# Patient Record
Sex: FEMALE | Race: ASIAN | NOT HISPANIC OR LATINO | URBAN - METROPOLITAN AREA
[De-identification: names, ages, dates, MRNs, and addresses within clinical notes are randomized per-mention and may not be internally consistent; named-entity substitution may affect disease eponyms.]

---

## 2019-06-09 ENCOUNTER — INPATIENT (INPATIENT)
Facility: HOSPITAL | Age: 59
LOS: 1 days | Discharge: HOME | DRG: 313 | End: 2019-06-11
Attending: INTERNAL MEDICINE | Admitting: INTERNAL MEDICINE
Payer: MEDICAID

## 2019-06-09 VITALS
TEMPERATURE: 98 F | WEIGHT: 119.93 LBS | HEART RATE: 112 BPM | DIASTOLIC BLOOD PRESSURE: 91 MMHG | SYSTOLIC BLOOD PRESSURE: 144 MMHG | RESPIRATION RATE: 16 BRPM | OXYGEN SATURATION: 98 % | HEIGHT: 61 IN

## 2019-06-09 DIAGNOSIS — I21.4 NON-ST ELEVATION (NSTEMI) MYOCARDIAL INFARCTION: ICD-10-CM

## 2019-06-09 DIAGNOSIS — R07.89 OTHER CHEST PAIN: ICD-10-CM

## 2019-06-09 DIAGNOSIS — I10 ESSENTIAL (PRIMARY) HYPERTENSION: ICD-10-CM

## 2019-06-09 DIAGNOSIS — Z29.9 ENCOUNTER FOR PROPHYLACTIC MEASURES, UNSPECIFIED: ICD-10-CM

## 2019-06-09 DIAGNOSIS — Z98.890 OTHER SPECIFIED POSTPROCEDURAL STATES: Chronic | ICD-10-CM

## 2019-06-09 LAB
ALBUMIN SERPL ELPH-MCNC: 4 G/DL — SIGNIFICANT CHANGE UP (ref 3.5–5)
ALP SERPL-CCNC: 110 U/L — SIGNIFICANT CHANGE UP (ref 40–120)
ALT FLD-CCNC: 32 U/L DA — SIGNIFICANT CHANGE UP (ref 10–60)
ANION GAP SERPL CALC-SCNC: 9 MMOL/L — SIGNIFICANT CHANGE UP (ref 5–17)
APTT BLD: 34.7 SEC — SIGNIFICANT CHANGE UP (ref 27.5–36.3)
AST SERPL-CCNC: 16 U/L — SIGNIFICANT CHANGE UP (ref 10–40)
BILIRUB SERPL-MCNC: 0.3 MG/DL — SIGNIFICANT CHANGE UP (ref 0.2–1.2)
BUN SERPL-MCNC: 11 MG/DL — SIGNIFICANT CHANGE UP (ref 7–18)
CALCIUM SERPL-MCNC: 9 MG/DL — SIGNIFICANT CHANGE UP (ref 8.4–10.5)
CHLORIDE SERPL-SCNC: 107 MMOL/L — SIGNIFICANT CHANGE UP (ref 96–108)
CK MB BLD-MCNC: <1.7 % — SIGNIFICANT CHANGE UP (ref 0–3.5)
CK MB CFR SERPL CALC: <1 NG/ML — SIGNIFICANT CHANGE UP (ref 0–3.6)
CK SERPL-CCNC: 58 U/L — SIGNIFICANT CHANGE UP (ref 21–215)
CO2 SERPL-SCNC: 27 MMOL/L — SIGNIFICANT CHANGE UP (ref 22–31)
CREAT SERPL-MCNC: 0.75 MG/DL — SIGNIFICANT CHANGE UP (ref 0.5–1.3)
D DIMER BLD IA.RAPID-MCNC: <150 NG/ML DDU — SIGNIFICANT CHANGE UP
GLUCOSE SERPL-MCNC: 118 MG/DL — HIGH (ref 70–99)
HCT VFR BLD CALC: 45.8 % — HIGH (ref 34.5–45)
HGB BLD-MCNC: 15 G/DL — SIGNIFICANT CHANGE UP (ref 11.5–15.5)
INR BLD: 0.93 RATIO — SIGNIFICANT CHANGE UP (ref 0.88–1.16)
MCHC RBC-ENTMCNC: 29.1 PG — SIGNIFICANT CHANGE UP (ref 27–34)
MCHC RBC-ENTMCNC: 32.8 GM/DL — SIGNIFICANT CHANGE UP (ref 32–36)
MCV RBC AUTO: 88.8 FL — SIGNIFICANT CHANGE UP (ref 80–100)
NRBC # BLD: 0 /100 WBCS — SIGNIFICANT CHANGE UP (ref 0–0)
PLATELET # BLD AUTO: 311 K/UL — SIGNIFICANT CHANGE UP (ref 150–400)
POTASSIUM SERPL-MCNC: 3.7 MMOL/L — SIGNIFICANT CHANGE UP (ref 3.5–5.3)
POTASSIUM SERPL-SCNC: 3.7 MMOL/L — SIGNIFICANT CHANGE UP (ref 3.5–5.3)
PROT SERPL-MCNC: 8.8 G/DL — HIGH (ref 6–8.3)
PROTHROM AB SERPL-ACNC: 10.3 SEC — SIGNIFICANT CHANGE UP (ref 10–12.9)
RBC # BLD: 5.16 M/UL — SIGNIFICANT CHANGE UP (ref 3.8–5.2)
RBC # FLD: 13.2 % — SIGNIFICANT CHANGE UP (ref 10.3–14.5)
SODIUM SERPL-SCNC: 143 MMOL/L — SIGNIFICANT CHANGE UP (ref 135–145)
TROPONIN I SERPL-MCNC: 0.02 NG/ML — SIGNIFICANT CHANGE UP (ref 0–0.04)
TROPONIN I SERPL-MCNC: 0.02 NG/ML — SIGNIFICANT CHANGE UP (ref 0–0.04)
TROPONIN I SERPL-MCNC: 0.05 NG/ML — HIGH (ref 0–0.04)
WBC # BLD: 5.73 K/UL — SIGNIFICANT CHANGE UP (ref 3.8–10.5)
WBC # FLD AUTO: 5.73 K/UL — SIGNIFICANT CHANGE UP (ref 3.8–10.5)

## 2019-06-09 PROCEDURE — 71045 X-RAY EXAM CHEST 1 VIEW: CPT | Mod: 26

## 2019-06-09 PROCEDURE — 93010 ELECTROCARDIOGRAM REPORT: CPT

## 2019-06-09 PROCEDURE — 99285 EMERGENCY DEPT VISIT HI MDM: CPT

## 2019-06-09 RX ORDER — METOPROLOL TARTRATE 50 MG
25 TABLET ORAL DAILY
Refills: 0 | Status: DISCONTINUED | OUTPATIENT
Start: 2019-06-09 | End: 2019-06-09

## 2019-06-09 RX ORDER — ATORVASTATIN CALCIUM 80 MG/1
40 TABLET, FILM COATED ORAL AT BEDTIME
Refills: 0 | Status: DISCONTINUED | OUTPATIENT
Start: 2019-06-09 | End: 2019-06-11

## 2019-06-09 RX ORDER — METOPROLOL TARTRATE 50 MG
12.5 TABLET ORAL DAILY
Refills: 0 | Status: DISCONTINUED | OUTPATIENT
Start: 2019-06-09 | End: 2019-06-11

## 2019-06-09 RX ORDER — ASPIRIN/CALCIUM CARB/MAGNESIUM 324 MG
81 TABLET ORAL DAILY
Refills: 0 | Status: DISCONTINUED | OUTPATIENT
Start: 2019-06-09 | End: 2019-06-11

## 2019-06-09 RX ADMIN — ATORVASTATIN CALCIUM 40 MILLIGRAM(S): 80 TABLET, FILM COATED ORAL at 21:15

## 2019-06-09 NOTE — ED ADULT NURSE REASSESSMENT NOTE - NS ED NURSE REASSESS COMMENT FT1
received pt from ARIK Sim, JOSR/ox3, breathing unlabored, NAD. Denies any pain. RAC20g present. Telemetry monitoring in process.

## 2019-06-09 NOTE — ED ADULT NURSE NOTE - CHIEF COMPLAINT QUOTE
C/o feeling chest pain, anxiety and dizziness on take off on flight from [facility]

## 2019-06-09 NOTE — ED PROVIDER NOTE - OBJECTIVE STATEMENT
59 y/o F pt with PMHx of HTN (adherent to medication) presents to ED c/o dizziness, chest pressure, tingling and stiffness to bilateral arms, and rapid HR x today. Pt reports onset of symptoms while on an airplane, shortly prior to take-off. Pt was provided with O2 and 2 ASA by the flight crew. Pt does report h/o anxiety/nervousness and believes symptoms might have been related to anxiety. In ED, pt reports symptoms are resolved and she feels back to baseline. Patient denies LOC, nausea, vomiting, abd pain, fever, chills, swelling, rash, headache, back pain, shortness of breath, h/o PE or DVT, h/o cardiac dz, or any other complaints. Social Hx: nonsmoker, no alcohol use, no drug use. Translation services offered but pt declined, opting to have family member at bedside translate. 59 y/o F pt with PMHx of HTN (adherent to medication) presents to ED c/o dizziness, chest pressure, tingling and stiffness to bilateral arms, and rapid HR x today since about 7:30 am. Pt reports onset of symptoms while on an airplane, shortly prior to take-off. Pt was provided with O2 and 2 ASA by the flight crew. Pt does report h/o anxiety/nervousness and believes symptoms might have been related to anxiety. In ED, pt reports symptoms are resolved and she feels back to baseline. Patient denies LOC, nausea, vomiting, abd pain, fever, chills, swelling, rash, headache, back pain, shortness of breath, h/o PE or DVT, h/o cardiac dz, or any other complaints. Social Hx: nonsmoker, no alcohol use, no drug use. Translation services offered but pt declined, opting to have family member at bedside translate.

## 2019-06-09 NOTE — ED ADULT TRIAGE NOTE - CHIEF COMPLAINT QUOTE
C/o feeling chest pain, anxiety and dizziness on take off on flight from [facility]

## 2019-06-09 NOTE — ED ADULT NURSE NOTE - NSIMPLEMENTINTERV_GEN_ALL_ED
Implemented All Universal Safety Interventions:  [place] to call system. Call bell, personal items and telephone within reach. Instruct patient to call for assistance. Room bathroom lighting operational. Non-slip footwear when patient is off stretcher. Physically safe environment: no spills, clutter or unnecessary equipment. Stretcher in lowest position, wheels locked, appropriate side rails in place.

## 2019-06-09 NOTE — H&P ADULT - ASSESSMENT
Patient is 57 y/o Female from [facility] with PMHx of HTN (adherent to medication) and Anxiety  presents to ED c/o dizziness, chest pressure, tingling and stiffness to bilateral arms since 1 day.  Pt reports onset of symptoms while on an airplane, shortly prior to take-off. Patient was feeling Dizzy, had episode of chest pressure, Right and left arm numbness. Pt was provided with O2 and 2 ASA by the flight crew. Pt does report h/o anxiety/nervousness and believes symptoms might have been related to anxiety. Patient has similar episode 1 week ago, which resolved spontaneously. currently patient denies chest pian, dizziness, lightheadedness, vertigo, upper and lower ext weakness. Patient denies fever, chills, headaches, blurring of vision, nystagmus, nausea, vomiting, diarrhea, abdominal pain, cough, SOB, palpitation, leg swelling, dysuria, or focal motor/sensory deficits.    On admission pt was afebrile, HD stable, EKG NSR trop T1 neg   no leucocytosis, Normal electrolytes and renal functions   CXR showed  Suspect right upper lobe infiltrate.  D-dimer Neg

## 2019-06-09 NOTE — ED PROVIDER NOTE - CHPI ED SYMPTOMS NEG
no nausea/no chills/no fever/no shortness of breath/no vomiting/no back pain/no LOC, no abd pain, no swelling, no rash, no headache

## 2019-06-09 NOTE — H&P ADULT - NSHPPHYSICALEXAM_GEN_ALL_CORE
Vital Signs Last 24 Hrs  T(C): 36.8 (09 Jun 2019 15:34), Max: 36.9 (09 Jun 2019 08:48)  T(F): 98.3 (09 Jun 2019 15:34), Max: 98.4 (09 Jun 2019 08:48)  HR: 74 (09 Jun 2019 15:34) (74 - 112)  BP: 100/71 (09 Jun 2019 15:34) (100/71 - 144/91)  RR: 18 (09 Jun 2019 15:34) (16 - 18)  SpO2: 99% (09 Jun 2019 15:34) (98% - 99%)

## 2019-06-09 NOTE — ED ADULT NURSE NOTE - OBJECTIVE STATEMENT
maranda from [facility] as the patient was experiencing, some chest pressure and anxiety while preparing for take off. feels better on arrival

## 2019-06-09 NOTE — ED ADULT NURSE REASSESSMENT NOTE - NS ED NURSE REASSESS COMMENT FT1
Received pt form ARIK Lauren, pt observed laying in bed, sleeping, breathing room air, in no respiratory distress at time of assessment. Pt is A&O x3, able to make needs known, skin intact, pt ambulates on own, right AC #20Ga in place. No meds to be administered at this time. Admitted to [facility], on box H, sinus rhythm noted at this time, HR 77. Report given to ARIK Guzman by [facility] Lauren (ED RN[facility]ing [facility], nursing monitoring continues. Family at bedside.

## 2019-06-09 NOTE — ED PROVIDER NOTE - PROGRESS NOTE DETAILS
Second troponin came back elevated.  EKG unremarkable.  Pt reassessed and currently no chest pain. Second troponin came back elevated.  EKG unremarkable.  Pt reassessed and currently no chest pain.  Pt confirms that she received baby aspirin x 2 on airplane just after onset of symptoms, and then baby aspirin x 2 given by EMS, therefore no additional aspirin to be given at this time. Informed Dr. LI Sousa for admission. TK hicks.

## 2019-06-09 NOTE — ED ADULT NURSE NOTE - ED STAT RN HANDOFF DETAILS
pt endorsed to Rn JOSR Zapata/[email or web address], telly box H in place, LAC20g. NAD, breathing unlabored, denies any pain.

## 2019-06-09 NOTE — ED PROVIDER NOTE - CLINICAL SUMMARY MEDICAL DECISION MAKING FREE TEXT BOX
59 y/o F pt presents with chest pressure, paresthesia, and palpitations. Suspect symptoms likely to be anxiety related. Will check labs with troponin, EKG, CXR, and reassess.

## 2019-06-09 NOTE — H&P ADULT - MUSCULOSKELETAL
detailed exam no joint warmth/normal strength/ROM intact/no joint swelling/no joint erythema/no calf tenderness

## 2019-06-09 NOTE — H&P ADULT - PROBLEM SELECTOR PLAN 1
patient presented with chest pressure and Dizziness    On admission pt was afebrile, HD stable, EKG NSR trop T1 neg   no leucocytosis, Normal electrolytes and renal functions   CXR showed  Suspect right upper lobe infiltrate.  D-dimer Neg   Trop T2 0.05  Likely panic attack vs anginal pain   c/w aspirin, statin and BB  f/u ECHO patient presented with chest pressure and Dizziness    On admission pt was afebrile, HD stable, EKG NSR trop T1 neg   no leucocytosis, Normal electrolytes and renal functions   CXR showed  Suspect right upper lobe infiltrate.  D-dimer Neg   Trop T2 0.05  Likely panic attack vs anginal pain   c/w aspirin, statin and BB  f/u ECHO  cardiology conslt Dr Navas

## 2019-06-09 NOTE — H&P ADULT - PROBLEM SELECTOR PLAN 3
IMPROVE VTE Individual Risk Assessment          RISK                                                          Points  [  ] Previous VTE                                                3  [  ] Thrombophilia                                             2  [  ] Lower limb paralysis                                   2        (unable to hold up >15 seconds)    [  ] Current Cancer                                             2         (within 6 months)  [ x ] Immobilization > 24 hrs                              1  [  ] ICU/CCU stay > 24 hours                             1  [  ] Age > 60                                                         1    IMPROVE VTE Score: VTE score 1  no indication for DVT prophylaxis

## 2019-06-09 NOTE — H&P ADULT - HISTORY OF PRESENT ILLNESS
Patient is 59 y/o Female from [facility] with PMHx of HTN (adherent to medication) and Anxiety  presents to ED c/o dizziness, chest pressure, tingling and stiffness to bilateral arms since 1 day.  Pt reports onset of symptoms while on an airplane, shortly prior to take-off. Patient was feeling Dizzy, had episode of chest pressure, Right and left arm numbness. Pt was provided with O2 and 2 ASA by the flight crew. Pt does report h/o anxiety/nervousness and believes symptoms might have been related to anxiety. Patient has similar episode 1 week ago, which resolved spontaneously. currently patient denies chest pian, dizziness, lightheadedness, vertigo, upper and lower ext weakness. Patient denies fever, chills, headaches, blurring of vision, nystagmus, nausea, vomiting, diarrhea, abdominal pain, cough, SOB, palpitation, leg swelling, dysuria, or focal motor/sensory deficits.    On admission pt was afebrile, HD stable, EKG NSR trop T1 neg   no leucocytosis, Normal electrolytes and renal functions   CXR showed  Suspect right upper lobe infiltrate.  D-dimer Neg

## 2019-06-10 LAB
ALBUMIN SERPL ELPH-MCNC: 3.5 G/DL — SIGNIFICANT CHANGE UP (ref 3.5–5)
ALP SERPL-CCNC: 98 U/L — SIGNIFICANT CHANGE UP (ref 40–120)
ALT FLD-CCNC: 28 U/L DA — SIGNIFICANT CHANGE UP (ref 10–60)
ANION GAP SERPL CALC-SCNC: 6 MMOL/L — SIGNIFICANT CHANGE UP (ref 5–17)
AST SERPL-CCNC: 16 U/L — SIGNIFICANT CHANGE UP (ref 10–40)
BASOPHILS # BLD AUTO: 0.06 K/UL — SIGNIFICANT CHANGE UP (ref 0–0.2)
BASOPHILS NFR BLD AUTO: 1.1 % — SIGNIFICANT CHANGE UP (ref 0–2)
BILIRUB SERPL-MCNC: 0.5 MG/DL — SIGNIFICANT CHANGE UP (ref 0.2–1.2)
BUN SERPL-MCNC: 14 MG/DL — SIGNIFICANT CHANGE UP (ref 7–18)
CALCIUM SERPL-MCNC: 8.4 MG/DL — SIGNIFICANT CHANGE UP (ref 8.4–10.5)
CHLORIDE SERPL-SCNC: 107 MMOL/L — SIGNIFICANT CHANGE UP (ref 96–108)
CHOLEST SERPL-MCNC: 252 MG/DL — HIGH (ref 10–199)
CO2 SERPL-SCNC: 28 MMOL/L — SIGNIFICANT CHANGE UP (ref 22–31)
CREAT SERPL-MCNC: 0.75 MG/DL — SIGNIFICANT CHANGE UP (ref 0.5–1.3)
EOSINOPHIL # BLD AUTO: 0.36 K/UL — SIGNIFICANT CHANGE UP (ref 0–0.5)
EOSINOPHIL NFR BLD AUTO: 6.5 % — HIGH (ref 0–6)
GLUCOSE SERPL-MCNC: 103 MG/DL — HIGH (ref 70–99)
HBA1C BLD-MCNC: 5.6 % — SIGNIFICANT CHANGE UP (ref 4–5.6)
HCT VFR BLD CALC: 42.5 % — SIGNIFICANT CHANGE UP (ref 34.5–45)
HCV AB S/CO SERPL IA: 0.15 S/CO — SIGNIFICANT CHANGE UP (ref 0–0.99)
HCV AB SERPL-IMP: SIGNIFICANT CHANGE UP
HDLC SERPL-MCNC: 42 MG/DL — LOW
HGB BLD-MCNC: 14 G/DL — SIGNIFICANT CHANGE UP (ref 11.5–15.5)
IMM GRANULOCYTES NFR BLD AUTO: 0.2 % — SIGNIFICANT CHANGE UP (ref 0–1.5)
LIPID PNL WITH DIRECT LDL SERPL: 172 MG/DL — SIGNIFICANT CHANGE UP
LYMPHOCYTES # BLD AUTO: 2.11 K/UL — SIGNIFICANT CHANGE UP (ref 1–3.3)
LYMPHOCYTES # BLD AUTO: 38.2 % — SIGNIFICANT CHANGE UP (ref 13–44)
MAGNESIUM SERPL-MCNC: 2.3 MG/DL — SIGNIFICANT CHANGE UP (ref 1.6–2.6)
MCHC RBC-ENTMCNC: 29.5 PG — SIGNIFICANT CHANGE UP (ref 27–34)
MCHC RBC-ENTMCNC: 32.9 GM/DL — SIGNIFICANT CHANGE UP (ref 32–36)
MCV RBC AUTO: 89.5 FL — SIGNIFICANT CHANGE UP (ref 80–100)
MONOCYTES # BLD AUTO: 0.91 K/UL — HIGH (ref 0–0.9)
MONOCYTES NFR BLD AUTO: 16.5 % — HIGH (ref 2–14)
NEUTROPHILS # BLD AUTO: 2.08 K/UL — SIGNIFICANT CHANGE UP (ref 1.8–7.4)
NEUTROPHILS NFR BLD AUTO: 37.5 % — LOW (ref 43–77)
NRBC # BLD: 0 /100 WBCS — SIGNIFICANT CHANGE UP (ref 0–0)
PHOSPHATE SERPL-MCNC: 4.1 MG/DL — SIGNIFICANT CHANGE UP (ref 2.5–4.5)
PLATELET # BLD AUTO: 286 K/UL — SIGNIFICANT CHANGE UP (ref 150–400)
POTASSIUM SERPL-MCNC: 3.6 MMOL/L — SIGNIFICANT CHANGE UP (ref 3.5–5.3)
POTASSIUM SERPL-SCNC: 3.6 MMOL/L — SIGNIFICANT CHANGE UP (ref 3.5–5.3)
PROT SERPL-MCNC: 7.7 G/DL — SIGNIFICANT CHANGE UP (ref 6–8.3)
RBC # BLD: 4.75 M/UL — SIGNIFICANT CHANGE UP (ref 3.8–5.2)
RBC # FLD: 13.4 % — SIGNIFICANT CHANGE UP (ref 10.3–14.5)
SODIUM SERPL-SCNC: 141 MMOL/L — SIGNIFICANT CHANGE UP (ref 135–145)
TOTAL CHOLESTEROL/HDL RATIO MEASUREMENT: 6 RATIO — SIGNIFICANT CHANGE UP (ref 3.3–7.1)
TRIGL SERPL-MCNC: 191 MG/DL — HIGH (ref 10–149)
TSH SERPL-MCNC: 1.85 UU/ML — SIGNIFICANT CHANGE UP (ref 0.34–4.82)
WBC # BLD: 5.53 K/UL — SIGNIFICANT CHANGE UP (ref 3.8–10.5)
WBC # FLD AUTO: 5.53 K/UL — SIGNIFICANT CHANGE UP (ref 3.8–10.5)

## 2019-06-10 PROCEDURE — 93018 CV STRESS TEST I&R ONLY: CPT

## 2019-06-10 PROCEDURE — 93016 CV STRESS TEST SUPVJ ONLY: CPT

## 2019-06-10 PROCEDURE — 78452 HT MUSCLE IMAGE SPECT MULT: CPT | Mod: 26

## 2019-06-10 RX ORDER — METOPROLOL TARTRATE 50 MG
0.5 TABLET ORAL
Qty: 15 | Refills: 0
Start: 2019-06-10 | End: 2019-07-09

## 2019-06-10 RX ORDER — AMLODIPINE BESYLATE 2.5 MG/1
1 TABLET ORAL
Qty: 0 | Refills: 0 | DISCHARGE

## 2019-06-10 RX ADMIN — Medication 12.5 MILLIGRAM(S): at 06:09

## 2019-06-10 RX ADMIN — Medication 81 MILLIGRAM(S): at 13:11

## 2019-06-10 RX ADMIN — ATORVASTATIN CALCIUM 40 MILLIGRAM(S): 80 TABLET, FILM COATED ORAL at 21:06

## 2019-06-10 NOTE — DISCHARGE NOTE PROVIDER - NSDCCPCAREPLAN_GEN_ALL_CORE_FT
PRINCIPAL DISCHARGE DIAGNOSIS  Diagnosis: Chest pain  Assessment and Plan of Treatment: Patient presented with chest pressure and dizziness.  On admission pt was afebrile, vitals stable, EKG showed NSR.  No leucocytosis. Normal electrolytes and renal functions.  Chest X-ray showed suspected right upper lobe infiltrate.  D-dimer was negative, making pulmonary embolism unlikely cause of the chest pain.  Cardiac enzyme troponin mildly elevated, but downtrended 0.017->0.052->0.016.  Echocardiogram and stress tests returned negative.  Pt remained asymptomatic throughout his admission.  Cardiologist Dr Navas followed the case.      SECONDARY DISCHARGE DIAGNOSES  Diagnosis: HTN (hypertension)  Assessment and Plan of Treatment: Pt was started on metoprolol for her hypertension. Take the medication as prescribed.

## 2019-06-10 NOTE — PROGRESS NOTE ADULT - SUBJECTIVE AND OBJECTIVE BOX
PGY 1 Note discussed with supervising resident and primary attending    Patient is a 58y old  Female who presents with a chief complaint of chest pain and dizziness (10 Romulo 2019 11:11)      INTERVAL HPI/OVERNIGHT EVENTS: No acute events overnight, remains afebrile; HD stable, H/H stable, WBC WNL  -Pt reports no new medical problems  -Tr 0.017->0.052->0.016, downtrending.   -Pending TTE, ECHO.   -Pt remains asymptomatic.     MEDICATIONS  (STANDING):  aspirin  chewable 81 milliGRAM(s) Oral daily  atorvastatin 40 milliGRAM(s) Oral at bedtime  metoprolol succinate ER 12.5 milliGRAM(s) Oral daily    MEDICATIONS  (PRN):      __________________________________________________  REVIEW OF SYSTEMS:    CONSTITUTIONAL: No fever  EYES: No acute visual disturbances  NECK: No pain or stiffness  RESPIRATORY: No cough; No shortness of breath  CARDIOVASCULAR: No chest pain, no palpitations  GASTROINTESTINAL: No pain. No nausea or vomiting; No diarrhea   NEUROLOGICAL: No headache or numbness, no tremors  MUSCULOSKELETAL: No joint pain, no muscle pain  GENITOURINARY: No dysuria, no frequency, no hesitancy      Vital Signs Last 24 Hrs  T(C): 36.4 (10 Romulo 2019 04:48), Max: 36.8 (09 Jun 2019 15:34)  T(F): 97.5 (10 Romulo 2019 04:48), Max: 98.3 (09 Jun 2019 15:34)  HR: 72 (10 Romulo 2019 06:09) (59 - 78)  BP: 126/78 (10 Romulo 2019 06:09) (98/60 - 129/72)  BP(mean): --  RR: 16 (10 Romulo 2019 06:09) (16 - 19)  SpO2: 96% (10 Romulo 2019 06:09) (96% - 100%)    ________________________________________________  PHYSICAL EXAM:    GENERAL: NAD  HEENT: Normocephalic;  conjunctivae and sclerae clear; moist mucous membranes;   NECK : supple  CHEST/LUNG: Clear to auscultation bilaterally with good air entry   HEART: S1 S2  regular; no murmurs, gallops or rubs  ABDOMEN: Soft, Nontender, Nondistended; Bowel sounds present  EXTREMITIES: No cyanosis; no edema; no calf tenderness  SKIN: Warm and dry; no rash  NERVOUS SYSTEM:  Awake and alert; no new deficits    _________________________________________________  LABS:                        14.0   5.53  )-----------( 286      ( 10 Romulo 2019 06:12 )             42.5     06-10    141  |  107  |  14  ----------------------------<  103<H>  3.6   |  28  |  0.75    Ca    8.4      10 Romulo 2019 06:12  Phos  4.1     06-10  Mg     2.3     06-10    TPro  7.7  /  Alb  3.5  /  TBili  0.5  /  DBili  x   /  AST  16  /  ALT  28  /  AlkPhos  98  06-10    PT/INR - ( 09 Jun 2019 09:45 )   PT: 10.3 sec;   INR: 0.93 ratio         PTT - ( 09 Jun 2019 09:45 )  PTT:34.7 sec    CAPILLARY BLOOD GLUCOSE            RADIOLOGY & ADDITIONAL TESTS:    Imaging Personally Reviewed:  YES    Consultant(s) Notes Reviewed:   YES    Care Discussed with Consultants :     Plan of care was discussed with patient and /or primary care giver; all questions and concerns were addressed and care was aligned with patient's wishes.

## 2019-06-10 NOTE — DISCHARGE NOTE PROVIDER - HOSPITAL COURSE
HPI:    Patient is 57 y/o Female from [facility] with PMHx of HTN (adherent to medication) and Anxiety  presents to ED c/o dizziness, chest pressure, tingling and stiffness to bilateral arms since 1 day.  Pt reports onset of symptoms while on an airplane, shortly prior to take-off. Patient was feeling Dizzy, had episode of chest pressure, Right and left arm numbness. Pt was provided with O2 and 2 ASA by the flight crew. Pt does report h/o anxiety/nervousness and believes symptoms might have been related to anxiety. Patient has similar episode 1 week ago, which resolved spontaneously. currently patient denies chest pian, dizziness, lightheadedness, vertigo, upper and lower ext weakness. Patient denies fever, chills, headaches, blurring of vision, nystagmus, nausea, vomiting, diarrhea, abdominal pain, cough, SOB, palpitation, leg swelling, dysuria, or focal motor/sensory deficits.        On admission pt was afebrile, HD stable, EKG NSR trop T1 neg     no leucocytosis, Normal electrolytes and renal functions     CXR showed  Suspect right upper lobe infiltrate.    D-dimer Neg (09 Jun 2019 17:58)        Chest pain, atypical    Patient presented with chest pressure and dizziness.    On admission pt was afebrile, vitals stable, EKG showed NSR.    No leucocytosis. Normal electrolytes and renal functions.    Chest X-ray showed suspected right upper lobe infiltrate.    D-dimer was negative, making pulmonary embolism unlikely cause of the chest pain.    Cardiac enzyme troponin mildly elevated, but downtrended 0.017->0.052->0.016.    Echocardiogram and stress tests returned negative.    Pt remained asymptomatic throughout his admission.    Cardiologist Dr Navas followed the case.         HTN (hypertension)    Pt was started on metoprolol for her hypertension. Take the medication as prescribed.

## 2019-06-10 NOTE — PROGRESS NOTE ADULT - PROBLEM SELECTOR PLAN 1
patient presented with chest pressure and Dizziness  On admission pt was afebrile, HD stable, EKG NSR trop T1 neg   no leucocytosis, Normal electrolytes and renal functions   CXR showed  Suspect right upper lobe infiltrate.  D-dimer Neg   Tr 0.017->0.052->0.016, downtrending.   Pending TTE, ECHO.   Pt remains asymptomatic.   c/w aspirin, statin and BB  cardiology consult Dr Navas

## 2019-06-10 NOTE — PROGRESS NOTE ADULT - ASSESSMENT
Patient is 59 y/o Female from [facility] with PMHx of HTN (adherent to medication) and Anxiety  presents to ED c/o dizziness, chest pressure, tingling and stiffness to bilateral arms since 1 day.  Pt reports onset of symptoms while on an airplane, shortly prior to take-off. Patient was feeling Dizzy, had episode of chest pressure, Right and left arm numbness. Pt was provided with O2 and 2 ASA by the flight crew. Pt does report h/o anxiety/nervousness and believes symptoms might have been related to anxiety. Patient has similar episode 1 week ago, which resolved spontaneously. currently patient denies chest pian, dizziness, lightheadedness, vertigo, upper and lower ext weakness. Patient denies fever, chills, headaches, blurring of vision, nystagmus, nausea, vomiting, diarrhea, abdominal pain, cough, SOB, palpitation, leg swelling, dysuria, or focal motor/sensory deficits.    On admission pt was afebrile, HD stable, EKG NSR trop T1 neg   no leucocytosis, Normal electrolytes and renal functions   CXR showed  Suspect right upper lobe infiltrate.  D-dimer Neg

## 2019-06-10 NOTE — CONSULT NOTE ADULT - SUBJECTIVE AND OBJECTIVE BOX
CHIEF COMPLAINT: chest pain    HPI: 57 y/o Female from [facility] with PMHx of HTN (adherent to medication) and Anxiety  presents to ED c/o dizziness, chest pressure, tingling and stiffness to bilateral arms since 1 day.  Pt reports onset of symptoms while on an airplane, shortly prior to take-off. Patient was feeling Dizzy, had episode of chest pressure, Right and left arm numbness. Pt was provided with O2 and 2 ASA by the flight crew. Pt does report h/o anxiety/nervousness and believes symptoms might have been related to anxiety. Patient has similar episode 1 week ago, which resolved spontaneously. currently patient denies chest pian, dizziness, lightheadedness, vertigo, upper and lower ext weakness. Patient denies fever, chills, headaches, blurring of vision, nystagmus, nausea, vomiting, diarrhea, abdominal pain, cough, SOB, palpitation, leg swelling, dysuria, or focal motor/sensory deficits.    On admission pt was afebrile, HD stable, EKG NSR trop T1 neg   no leucocytosis, Normal electrolytes and renal functions   CXR showed  Suspect right upper lobe infiltrate.  D-dimer Neg (09 Jun 2019 17:58)    PAST MEDICAL & SURGICAL HISTORY:  HTN (hypertension)  H/O ovarian cystectomy      Allergies    No Known Allergies    Intolerances        MEDICATIONS  (STANDING):  aspirin  chewable 81 milliGRAM(s) Oral daily  atorvastatin 40 milliGRAM(s) Oral at bedtime  metoprolol succinate ER 12.5 milliGRAM(s) Oral daily    MEDICATIONS  (PRN):      FAMILY HISTORY:  No pertinent family history in first degree relatives    No family history of premature coronary artery disease or sudden cardiac death    SOCIAL HISTORY:  Smoking-  Alcohol-  Illicit Drug use-    REVIEW OF SYSTEMS:  Constitutional: [ ] fever, [ ]weight loss,  [ ]fatigue  Eyes: [ ] visual changes  Respiratory: [x]shortness of breath;  [ ] cough, [ ]wheezing, [ ]chills, [ ]hemoptysis  Cardiovascular: [ ] chest pain, [ ]palpitations, [ ]dizziness,  [ ]leg swelling [ ]syncope  Gastrointestinal: [ ] abdominal pain, [ ]nausea, [ ]vomiting,  [ ]diarrhea   Genitourinary: [ ] dysuria, [ ] hematuria  Neurologic: [ ] headaches [ ] tremors  [ ] weakness [ ] lightheadedness  Skin: [ ] itching, [ ]burning, [ ] rashes  Endocrine: [ ] heat or cold intolerance  Musculoskeletal: [ ] joint pain or swelling; [ ] muscle, back, or extremity pain  Psychiatric: [ ] depression, [ ]anxiety, [ ]mood swings, or [ ]difficulty sleeping  Hematologic: [ ] easy bruising, [ ] bleeding gums       [ x] All others negative	  [ ] Unable to obtain    Vital Signs Last 24 Hrs  T(C): 36.4 (10 Romulo 2019 04:48), Max: 36.9 (09 Jun 2019 11:17)  T(F): 97.5 (10 Romulo 2019 04:48), Max: 98.4 (09 Jun 2019 11:17)  HR: 72 (10 Romulo 2019 06:09) (59 - 88)  BP: 126/78 (10 Romulo 2019 06:09) (98/60 - 129/72)  BP(mean): --  RR: 16 (10 Romulo 2019 06:09) (16 - 19)  SpO2: 96% (10 Romulo 2019 06:09) (96% - 100%)  I&O's Summary      PHYSICAL EXAM:  General: No acute distress  HEENT: EOMI, PERRL  Neck: Supple, No JVD  Lungs: Clear to auscultation bilaterally; No rales or wheezing  Heart: Regular rate and rhythm; No murmurs, rubs, or gallops  Abdomen: Nontender, bowel sounds present  Extremities: No clubbing, cyanosis, or edema  Nervous system:  Alert & Oriented X3, no focal deficits  Psychiatric: Normal affect  Skin: No rashes or lesions      LABS:  06-10    141  |  107  |  14  ----------------------------<  103<H>  3.6   |  28  |  0.75    Ca    8.4      10 Romulo 2019 06:12  Phos  4.1     06-10  Mg     2.3     06-10    TPro  7.7  /  Alb  3.5  /  TBili  0.5  /  DBili  x   /  AST  16  /  ALT  28  /  AlkPhos  98  06-10    Creatinine Trend: 0.75<--, 0.75<--                        14.0   5.53  )-----------( 286      ( 10 Romulo 2019 06:12 )             42.5     PT/INR - ( 09 Jun 2019 09:45 )   PT: 10.3 sec;   INR: 0.93 ratio         PTT - ( 09 Jun 2019 09:45 )  PTT:34.7 sec    Lipid Panel: Cholesterol, Serum 252  Direct   HDL Cholesterol, Serum 42  Triglycerides, Serum 191    Cardiac Enzymes: CARDIAC MARKERS ( 09 Jun 2019 18:40 )  0.016 ng/mL / x     / 58 U/L / x     / <1.0 ng/mL  CARDIAC MARKERS ( 09 Jun 2019 12:29 )  0.052 ng/mL / x     / x     / x     / x      CARDIAC MARKERS ( 09 Jun 2019 09:45 )  0.017 ng/mL / x     / x     / x     / x            06-10 [id number] 5.6      RADIOLOGY:    ECG [my interpretation]: NSR    TELEMETRY: No acute findings    ECHO: N/A    STRESS TEST: N/A    CATHETERIZATION: N/A

## 2019-06-10 NOTE — CONSULT NOTE ADULT - ASSESSMENT
57 y/o F w/ [facility] PMH HTN and Anxiety presents to ED c/o dizziness, chest pressure, tingling and stiffness to bilateral arms since 1 day - cardiology for concerns of ACS     Problem/Plan - 1:  ·  Problem: Chest pain, atypical.  Plan:   - Troponin peaked at 0.052, EKG NSR, and no acute findings on telemetry  - Moderate risk given age, post menopausal, and PMH HTN; recommend TTE and nuclear stress test for further work up  - C/w aspirin, statin and BB     Problem/Plan - 2:  ·  Problem: HTN (hypertension).  Plan: C/w metoprolol.      Problem/Plan - 3:  ·  Problem: Prophylactic measure. DVT and GI PPx

## 2019-06-11 VITALS
SYSTOLIC BLOOD PRESSURE: 109 MMHG | OXYGEN SATURATION: 97 % | RESPIRATION RATE: 17 BRPM | HEART RATE: 75 BPM | TEMPERATURE: 98 F | DIASTOLIC BLOOD PRESSURE: 76 MMHG

## 2019-06-11 LAB
ANION GAP SERPL CALC-SCNC: 5 MMOL/L — SIGNIFICANT CHANGE UP (ref 5–17)
BUN SERPL-MCNC: 12 MG/DL — SIGNIFICANT CHANGE UP (ref 7–18)
CALCIUM SERPL-MCNC: 8.6 MG/DL — SIGNIFICANT CHANGE UP (ref 8.4–10.5)
CHLORIDE SERPL-SCNC: 106 MMOL/L — SIGNIFICANT CHANGE UP (ref 96–108)
CO2 SERPL-SCNC: 29 MMOL/L — SIGNIFICANT CHANGE UP (ref 22–31)
CREAT SERPL-MCNC: 0.76 MG/DL — SIGNIFICANT CHANGE UP (ref 0.5–1.3)
GLUCOSE SERPL-MCNC: 110 MG/DL — HIGH (ref 70–99)
HCT VFR BLD CALC: 42.3 % — SIGNIFICANT CHANGE UP (ref 34.5–45)
HGB BLD-MCNC: 13.8 G/DL — SIGNIFICANT CHANGE UP (ref 11.5–15.5)
MCHC RBC-ENTMCNC: 29.1 PG — SIGNIFICANT CHANGE UP (ref 27–34)
MCHC RBC-ENTMCNC: 32.6 GM/DL — SIGNIFICANT CHANGE UP (ref 32–36)
MCV RBC AUTO: 89.1 FL — SIGNIFICANT CHANGE UP (ref 80–100)
NRBC # BLD: 0 /100 WBCS — SIGNIFICANT CHANGE UP (ref 0–0)
PLATELET # BLD AUTO: 294 K/UL — SIGNIFICANT CHANGE UP (ref 150–400)
POTASSIUM SERPL-MCNC: 3.9 MMOL/L — SIGNIFICANT CHANGE UP (ref 3.5–5.3)
POTASSIUM SERPL-SCNC: 3.9 MMOL/L — SIGNIFICANT CHANGE UP (ref 3.5–5.3)
RBC # BLD: 4.75 M/UL — SIGNIFICANT CHANGE UP (ref 3.8–5.2)
RBC # FLD: 13.1 % — SIGNIFICANT CHANGE UP (ref 10.3–14.5)
SODIUM SERPL-SCNC: 140 MMOL/L — SIGNIFICANT CHANGE UP (ref 135–145)
WBC # BLD: 4.87 K/UL — SIGNIFICANT CHANGE UP (ref 3.8–10.5)
WBC # FLD AUTO: 4.87 K/UL — SIGNIFICANT CHANGE UP (ref 3.8–10.5)

## 2019-06-11 RX ORDER — ALPRAZOLAM 0.25 MG
1 TABLET ORAL
Qty: 30 | Refills: 0
Start: 2019-06-11 | End: 2019-07-10

## 2019-06-11 RX ORDER — ALPRAZOLAM 0.25 MG
0.25 TABLET ORAL AT BEDTIME
Refills: 0 | Status: DISCONTINUED | OUTPATIENT
Start: 2019-06-11 | End: 2019-06-11

## 2019-06-11 RX ADMIN — Medication 81 MILLIGRAM(S): at 11:41

## 2019-06-11 RX ADMIN — Medication 12.5 MILLIGRAM(S): at 05:07

## 2019-06-11 NOTE — DISCHARGE NOTE NURSING/CASE MANAGEMENT/SOCIAL WORK - NSDCDPATPORTLINK_GEN_ALL_CORE
You can access the [facility] Patient Portal, offered by [facility], by registering with the following website: [email or web address]

## 2019-07-10 PROCEDURE — 78452 HT MUSCLE IMAGE SPECT MULT: CPT

## 2019-07-10 PROCEDURE — 82550 ASSAY OF CK (CPK): CPT

## 2019-07-10 PROCEDURE — 84443 ASSAY THYROID STIM HORMONE: CPT

## 2019-07-10 PROCEDURE — 86803 HEPATITIS C AB TEST: CPT

## 2019-07-10 PROCEDURE — 99285 EMERGENCY DEPT VISIT HI MDM: CPT | Mod: 25

## 2019-07-10 PROCEDURE — 83735 ASSAY OF MAGNESIUM: CPT

## 2019-07-10 PROCEDURE — 93005 ELECTROCARDIOGRAM TRACING: CPT

## 2019-07-10 PROCEDURE — 84484 ASSAY OF TROPONIN QUANT: CPT

## 2019-07-10 PROCEDURE — 93017 CV STRESS TEST TRACING ONLY: CPT

## 2019-07-10 PROCEDURE — 83036 HEMOGLOBIN GLYCOSYLATED A1C: CPT

## 2019-07-10 PROCEDURE — 71045 X-RAY EXAM CHEST 1 VIEW: CPT

## 2019-07-10 PROCEDURE — 84100 ASSAY OF PHOSPHORUS: CPT

## 2019-07-10 PROCEDURE — 85379 FIBRIN DEGRADATION QUANT: CPT

## 2019-07-10 PROCEDURE — 85730 THROMBOPLASTIN TIME PARTIAL: CPT

## 2019-07-10 PROCEDURE — 82962 GLUCOSE BLOOD TEST: CPT

## 2019-07-10 PROCEDURE — A9502: CPT

## 2019-07-10 PROCEDURE — 85610 PROTHROMBIN TIME: CPT

## 2019-07-10 PROCEDURE — 80053 COMPREHEN METABOLIC PANEL: CPT

## 2019-07-10 PROCEDURE — 85027 COMPLETE CBC AUTOMATED: CPT

## 2019-07-10 PROCEDURE — 80048 BASIC METABOLIC PNL TOTAL CA: CPT

## 2019-07-10 PROCEDURE — 36415 COLL VENOUS BLD VENIPUNCTURE: CPT

## 2019-07-10 PROCEDURE — 93306 TTE W/DOPPLER COMPLETE: CPT

## 2019-07-10 PROCEDURE — 82553 CREATINE MB FRACTION: CPT

## 2019-07-10 PROCEDURE — 80061 LIPID PANEL: CPT

## 2020-02-14 NOTE — ED PROVIDER NOTE - TEMPLATE
Reported Tmax 102, relative hypotension, Lactate 2.2 in immunocompromised host  S/p 2.3L LR and vanc and cefepime in ED.   CXR w/o infiltrate, RVP negative, blood and urine cultures collected. Pt s/p methylpred 125 in ED. Low suspicion for adrenal insufficiency as pt only receives prednisone 10 every three weeks with chemo.   - C/w broad spectrum abx including vancomycin and cefepime pending blood and urine cultures  - C/w LR at 125 cc/hr Reported Tmax 102F, relative hypotension, Lactate 2.2 in immunocompromised host  S/p 2.3L LR and vanc and cefepime in ED.   CXR w/o infiltrate, RVP negative, blood and urine cultures collected. Pt s/p methylpred 125 in ED. Low suspicion for adrenal insufficiency as pt only receives prednisone 10 every three weeks with chemo.   - C/w cefepime, d/c Vanco. blood and urine cultures prelimp neg  - s/p LR at 125 cc/hr x 10 hrs  - await physical therapy. Reported Tmax 102F, relative hypotension, Lactate 2.2 in immunocompromised host  S/p 2.3L LR and vanc and cefepime in ED.   CXR w/o infiltrate, RVP negative, blood and urine cultures collected. Pt s/p methylpred 125 in ED. Low suspicion for adrenal insufficiency as pt only receives prednisone 10 every three weeks with chemo.   - Micro data (-), off antibiotics as per ID  - s/p LR at 125 cc/hr x 10 hrs  - Resolved Reported Tmax 102F, relative hypotension, Lactate 2.2 in immunocompromised host  S/p 2.3L LR and vanc and cefepime in ED.   CXR w/o infiltrate, RVP negative, blood and urine cultures collected. Pt s/p methylpred 125 in ED. Low suspicion for adrenal insufficiency as pt only receives prednisone 10 every three weeks with chemo.   - Micro data (-), off antibiotics as per ID  - s/p LR at 125 cc/hr x 10 hrs  - await physical therapy. Stop antibiotics afeb > 24 hours  all cultures neg  no diarrhea  no s/soft tissue infection  abd benign  d/c vancomycin now resolved  all cultures neg  cont to monitor off antibx Resolved. Cardiac

## 2020-07-10 NOTE — ED PROVIDER NOTE - NS ED MD EM SELECTION
[FreeTextEntry1] : Please note the patient was reviewed with NP Mitzi Vanegas.\par I was physically present during the service of the patient\par I was directly involved in the management plan and recommendations of care provided to the patient. \par I personally reviewed the history and physical exam and examination as documented by the NP above.\par 07/09/2020\par 
[id number] Comprehensive

## 2023-01-06 NOTE — PATIENT PROFILE ADULT - NSPROSPHOSPCHAPLAINYN_GEN_A_NUR
Dr. Derrill Goldberg returned called. I informed physician that the wound culture was positive for MRSA. Patient was treated with vancomycin today. Physician asked for fax of results, which was already completed. Relayed information about patient seeing general surgeon, Dr. Meme Eden on 7/24/2017.
uterus, cervix, and bilateral fallopian tubes
no